# Patient Record
Sex: FEMALE | Race: WHITE | NOT HISPANIC OR LATINO | URBAN - METROPOLITAN AREA
[De-identification: names, ages, dates, MRNs, and addresses within clinical notes are randomized per-mention and may not be internally consistent; named-entity substitution may affect disease eponyms.]

---

## 2021-04-13 DIAGNOSIS — Z23 ENCOUNTER FOR IMMUNIZATION: ICD-10-CM

## 2021-04-30 ENCOUNTER — IMMUNIZATIONS (OUTPATIENT)
Dept: FAMILY MEDICINE CLINIC | Facility: HOSPITAL | Age: 61
End: 2021-04-30

## 2021-04-30 DIAGNOSIS — Z23 ENCOUNTER FOR IMMUNIZATION: Primary | ICD-10-CM

## 2021-04-30 PROCEDURE — 0001A SARS-COV-2 / COVID-19 MRNA VACCINE (PFIZER-BIONTECH) 30 MCG: CPT

## 2021-04-30 PROCEDURE — 91300 SARS-COV-2 / COVID-19 MRNA VACCINE (PFIZER-BIONTECH) 30 MCG: CPT

## 2021-05-21 ENCOUNTER — IMMUNIZATIONS (OUTPATIENT)
Dept: FAMILY MEDICINE CLINIC | Facility: HOSPITAL | Age: 61
End: 2021-05-21

## 2021-05-21 DIAGNOSIS — Z23 ENCOUNTER FOR IMMUNIZATION: Primary | ICD-10-CM

## 2021-05-21 PROCEDURE — 91300 SARS-COV-2 / COVID-19 MRNA VACCINE (PFIZER-BIONTECH) 30 MCG: CPT

## 2021-05-21 PROCEDURE — 0002A SARS-COV-2 / COVID-19 MRNA VACCINE (PFIZER-BIONTECH) 30 MCG: CPT

## 2023-08-29 ENCOUNTER — OFFICE VISIT (OUTPATIENT)
Dept: URGENT CARE | Facility: CLINIC | Age: 63
End: 2023-08-29
Payer: COMMERCIAL

## 2023-08-29 VITALS
HEIGHT: 60 IN | TEMPERATURE: 96.5 F | SYSTOLIC BLOOD PRESSURE: 152 MMHG | WEIGHT: 145 LBS | RESPIRATION RATE: 14 BRPM | DIASTOLIC BLOOD PRESSURE: 94 MMHG | HEART RATE: 106 BPM | OXYGEN SATURATION: 98 % | BODY MASS INDEX: 28.47 KG/M2

## 2023-08-29 DIAGNOSIS — S01.81XA CHIN LACERATION, INITIAL ENCOUNTER: Primary | ICD-10-CM

## 2023-08-29 PROCEDURE — 99213 OFFICE O/P EST LOW 20 MIN: CPT | Performed by: PHYSICIAN ASSISTANT

## 2023-08-29 PROCEDURE — 12051 INTMD RPR FACE/MM 2.5 CM/<: CPT | Performed by: PHYSICIAN ASSISTANT

## 2023-08-29 PROCEDURE — 90715 TDAP VACCINE 7 YRS/> IM: CPT | Performed by: PHYSICIAN ASSISTANT

## 2023-08-29 PROCEDURE — 12020 TX SUPFC WND DEHSN SMPL CLSR: CPT | Performed by: PHYSICIAN ASSISTANT

## 2023-08-29 PROCEDURE — 90471 IMMUNIZATION ADMIN: CPT | Performed by: PHYSICIAN ASSISTANT

## 2023-08-29 RX ORDER — DIPHENHYDRAMINE HYDROCHLORIDE AND LIDOCAINE HYDROCHLORIDE AND ALUMINUM HYDROXIDE AND MAGNESIUM HYDRO
10 KIT EVERY 4 HOURS PRN
Qty: 120 ML | Refills: 0 | Status: SHIPPED | OUTPATIENT
Start: 2023-08-29

## 2023-08-29 NOTE — PROGRESS NOTES
[facility]        NAME: Hazel Juarez is a 63 y.o. female  : 1960    MRN: [id number]  DATE: 2023  TIME: 10:27 AM    Assessment and Plan   Chin laceration, initial encounter [H70.06IB]  1. Chin laceration, initial encounter  Tdap Vaccine greater than or equal to 8yo    Laceration repair        Bleeding controlled and 4 sutures were placed in her chin. No obvious signs of fx. Patient Instructions   Patient Instructions     Come back on  or 9/3 for suture removal   We will not do antibiotics at this time as I have extensively cleaned the wound   If you notice any signs of infection such as redness to the area or drainage call the office and I will send in an oral antibiotic  Keep the area dry 24 hours. After that you may shower normally but do not soak the wound  If you develop a headache or blurry vision go to the ER         Care For Your Stitches   WHAT YOU NEED TO KNOW:   Stitches, or sutures, are used to close cuts and wounds on the skin. Stitches need to be removed after your wound has healed. DISCHARGE INSTRUCTIONS:   Return to the emergency department if:   • Your stitches come apart. • Blood soaks through your bandages. • You suddenly cannot move your injured joint. • You have sudden numbness around your wound. • You see red streaks coming from your wound. Contact your healthcare provider if:   • You have a fever and chills. • Your wound is red, warm, swollen, or leaking pus. • There is a bad smell coming from your wound. • You have increased pain in the wound area. • You have questions or concerns about your condition or care. Care for your stitches:   • Protect the stitches. You may need to cover your stitches with a bandage for 24 to 48 hours, or as directed. Do not bump or hit the suture area. This could open the wound. Do not trim or shorten the ends of your stitches.  If they rub on your clothing, put a gauze bandage between the stitches and your clothes. • Clean the area as directed. Carefully wash your wound with soap and water. For mouth and lip wounds, rinse your mouth after meals and at bedtime. Ask your healthcare provider what to use to rinse your mouth. If you have a scalp wound, you may gently wash your hair every 2 days with mild shampoo. Do not use hair products, such as hair spray. Check your wound for signs of infection when you clean it. Signs include redness, swelling, and pus. • Keep the area dry as directed. Wait 12 to 24 hours after you receive your stitches before you take a shower. Take showers instead of baths. Do not take a bath or swim. Your healthcare provider will give you instructions for bathing with your stitches. Help your wound heal:   • Elevate your wound. Keep your wound above the level of your heart as often as you can. This will help decrease swelling and pain. Prop the area on pillows or blankets, if possible, to keep it elevated comfortably. • Limit activity. Do not stretch the skin around your wound. This will help prevent bleeding and swelling. Follow up with your healthcare provider as directed: You may need to return to have your stitches removed. Write down your questions so you remember to ask them during your visits. © Copyright [facility] 2022 Information is for End User's use only and may not be sold, redistributed or otherwise used for commercial purposes. The above information is an  only. It is not intended as medical advice for individual conditions or treatments. Talk to your doctor, nurse or pharmacist before following any medical regimen to see if it is safe and effective for you. Follow up with PCP in 3-5 days. Proceed to  ER if symptoms worsen.     Chief Complaint     Chief Complaint   Patient presents with   • Facial Laceration     Pt reports walking and tripped falling on chin          History of Present Illness       The patient is a 61-year-old female presenting today for a 1 inch laceration to her chin. She reports that she was walking this morning when she tripped and fell onto her face. She hit her chin and her nose. Denies pain in the nose or forehead. Reports pain in her lip and chin. Also reports an abrasion on her left knee. Denies any headache, blurry vision, double vision or neurologic changes. Admits that the wound from her chin has been bleeding. - blood thinners or medications. Review of Systems   Review of Systems   Constitutional: Negative for activity change and appetite change. Skin: Positive for color change (abrasion to nose and L knee ) and wound (1 inch wound to the chin ). Neurological: Negative for dizziness, speech difficulty, weakness, light-headedness, numbness and headaches. Current Medications     No current outpatient medications on file. Current Allergies     Allergies as of 08/29/2023 - Reviewed 08/29/2023   Allergen Reaction Noted   • Penicillins Hives 08/29/2014            The following portions of the patient's history were reviewed and updated as appropriate: allergies, current medications, past family history, past medical history, past social history, past surgical history and problem list.     History reviewed. No pertinent past medical history. History reviewed. No pertinent surgical history. History reviewed. No pertinent family history. Medications have been verified. Objective   /94   Pulse (!) 106   Temp (!) 96.5 °F (35.8 °C)   Resp 14   Ht 5' (1.524 m)   Wt 65.8 kg (145 lb)   SpO2 98%   BMI 28.32 kg/m²          Physical Exam     Physical Exam  Vitals and nursing note reviewed. Constitutional:       Appearance: Normal appearance. She is normal weight. HENT:      Mouth/Throat:     Cardiovascular:      Rate and Rhythm: Normal rate. Pulmonary:      Effort: Pulmonary effort is normal.   Musculoskeletal:         General: Normal range of motion. Skin:     General: Skin is warm. Capillary Refill: Capillary refill takes less than 2 seconds. Neurological:      Mental Status: She is alert. Universal Protocol:  Consent: Verbal consent obtained. Risks and benefits: risks, benefits and alternatives were discussed  Consent given by: patient  Patient understanding: patient states understanding of the procedure being performed  Patient consent: the patient's understanding of the procedure matches consent given  Procedure consent: procedure consent matches procedure scheduled  Relevant documents: relevant documents present and verified (Denied allergies )  Test results: test results available and properly labeled  Site marked: the operative site was marked  Required items: required blood products, implants, devices, and special equipment available  Patient identity confirmed: verbally with patient    Laceration repair    Date/Time: 8/29/2023 9:00 AM    Performed by: Enrique Molina PA-C  Authorized by: Enrique Molina PA-C  Body area: head/neck  Location details: chin  Laceration length: 2.5 cm  Foreign body present: gravel removed   Tendon involvement: none  Nerve involvement: none    Anesthesia:  Local Anesthetic: lidocaine 1% with epinephrine  Anesthetic total: 5 mL    Sedation:  Patient sedated: no      Wound Dehiscence:  Superficial Wound Dehiscence: simple closure      Procedure Details:  Preparation: Patient was prepped and draped in the usual sterile fashion. Irrigation solution: saline (30 ml)  Irrigation method: syringe  Amount of cleaning: extensive (cleaned with wound cleanser and betadine )  Skin closure: 6-0 nylon  Number of sutures: 4  Technique: simple  Approximation: close  Approximation difficulty: simple  Dressing: non-adhesive packing strip  Patient tolerance: patient tolerated the procedure well with no immediate complications  Comments: Non adhesive strip and transparent film dressing placed over it. Cleaning details: gravel

## 2023-08-29 NOTE — PATIENT INSTRUCTIONS
Come back on 9/2 or 9/3 for suture removal   We will not do antibiotics at this time as I have extensively cleaned the wound   If you notice any signs of infection such as redness to the area or drainage call the office and I will send in an oral antibiotic  Keep the area dry 24 hours. After that you may shower normally but do not soak the wound  If you develop a headache or blurry vision go to the ER         Care For Your Stitches   WHAT YOU NEED TO KNOW:   Stitches, or sutures, are used to close cuts and wounds on the skin. Stitches need to be removed after your wound has healed. DISCHARGE INSTRUCTIONS:   Return to the emergency department if:   Your stitches come apart. Blood soaks through your bandages. You suddenly cannot move your injured joint. You have sudden numbness around your wound. You see red streaks coming from your wound. Contact your healthcare provider if:   You have a fever and chills. Your wound is red, warm, swollen, or leaking pus. There is a bad smell coming from your wound. You have increased pain in the wound area. You have questions or concerns about your condition or care. Care for your stitches:   Protect the stitches. You may need to cover your stitches with a bandage for 24 to 48 hours, or as directed. Do not bump or hit the suture area. This could open the wound. Do not trim or shorten the ends of your stitches. If they rub on your clothing, put a gauze bandage between the stitches and your clothes. Clean the area as directed. Carefully wash your wound with soap and water. For mouth and lip wounds, rinse your mouth after meals and at bedtime. Ask your healthcare provider what to use to rinse your mouth. If you have a scalp wound, you may gently wash your hair every 2 days with mild shampoo. Do not use hair products, such as hair spray. Check your wound for signs of infection when you clean it. Signs include redness, swelling, and pus.     Keep the area dry as directed. Wait 12 to 24 hours after you receive your stitches before you take a shower. Take showers instead of baths. Do not take a bath or swim. Your healthcare provider will give you instructions for bathing with your stitches. Help your wound heal:   Elevate your wound. Keep your wound above the level of your heart as often as you can. This will help decrease swelling and pain. Prop the area on pillows or blankets, if possible, to keep it elevated comfortably. Limit activity. Do not stretch the skin around your wound. This will help prevent bleeding and swelling. Follow up with your healthcare provider as directed: You may need to return to have your stitches removed. Write down your questions so you remember to ask them during your visits. © Copyright [facility] 2022 Information is for End User's use only and may not be sold, redistributed or otherwise used for commercial purposes. The above information is an  only. It is not intended as medical advice for individual conditions or treatments. Talk to your doctor, nurse or pharmacist before following any medical regimen to see if it is safe and effective for you.

## 2023-09-01 ENCOUNTER — OFFICE VISIT (OUTPATIENT)
Dept: URGENT CARE | Facility: CLINIC | Age: 63
End: 2023-09-01
Payer: COMMERCIAL

## 2023-09-01 VITALS
OXYGEN SATURATION: 100 % | SYSTOLIC BLOOD PRESSURE: 110 MMHG | BODY MASS INDEX: 28.32 KG/M2 | HEIGHT: 60 IN | TEMPERATURE: 97 F | RESPIRATION RATE: 16 BRPM | DIASTOLIC BLOOD PRESSURE: 70 MMHG | HEART RATE: 76 BPM

## 2023-09-01 DIAGNOSIS — S01.81XD CHIN LACERATION, SUBSEQUENT ENCOUNTER: Primary | ICD-10-CM

## 2023-09-01 DIAGNOSIS — Z48.02 ENCOUNTER FOR REMOVAL OF SUTURES: ICD-10-CM

## 2023-09-01 PROCEDURE — 99213 OFFICE O/P EST LOW 20 MIN: CPT | Performed by: PHYSICIAN ASSISTANT

## 2023-09-01 NOTE — PATIENT INSTRUCTIONS
Sutures removed, wound appears well-healed, no signs of infection. Discussed proper wound care. Follow-up with PCP.

## 2023-09-01 NOTE — PROGRESS NOTES
[facility]        NAME: Ismael Perry is a 63 y.o. female  : 1960    MRN: [id number]  DATE: 2023  TIME: 11:50 AM    Assessment and Plan   Chin laceration, subsequent encounter [S01.81XD]  1. Chin laceration, subsequent encounter  Suture removal      2. Encounter for removal of sutures  Suture removal            Patient Instructions     Patient Instructions   Sutures removed, wound appears well-healed, no signs of infection. Discussed proper wound care. Follow-up with PCP. Follow up with PCP in 3-5 days. Proceed to  ER if symptoms worsen. Chief Complaint     Chief Complaint   Patient presents with   • Suture / Staple Removal     For suture removal today         History of Present Illness       Patient is a 24-year-old female presenting today for suture removal.  Patient was seen and evaluated here on 2023 after sustaining a injury to her chin, notes while out on a walk she lost her balance and hit her chin against the sidewalk, had 4 sutures placed to her chin, has been keeping the area clean and dry, denies any signs of infection. Denies fever, wound discharge or drainage, bleeding, numbness, tingling. Review of Systems   Review of Systems   Constitutional: Negative for chills and fever. HENT: Negative for ear pain and sore throat. Eyes: Negative for pain and visual disturbance. Respiratory: Negative for cough and shortness of breath. Cardiovascular: Negative for chest pain and palpitations. Gastrointestinal: Negative for abdominal pain and vomiting. Genitourinary: Negative for dysuria and hematuria. Musculoskeletal: Negative for arthralgias and back pain. Skin: Positive for wound. Neurological: Negative for seizures and syncope. All other systems reviewed and are negative.         Current Medications       Current Outpatient Medications:   •  diphenhydramine, lidocaine, Al/Mg hydroxide, simethicone (Magic Mouthwash) SUSP, Swish and spit 10 mL every 4 (four) hours as needed for mouth pain or discomfort, Disp: 120 mL, Rfl: 0    Current Allergies     Allergies as of 09/01/2023 - Reviewed 09/01/2023   Allergen Reaction Noted   • Penicillins Hives 08/29/2014            The following portions of the patient's history were reviewed and updated as appropriate: allergies, current medications, past family history, past medical history, past social history, past surgical history and problem list.     History reviewed. No pertinent past medical history. History reviewed. No pertinent surgical history. History reviewed. No pertinent family history. Medications have been verified. Objective   /70 (BP Location: Left arm)   Pulse 76   Temp (!) 97 °F (36.1 °C) (Tympanic)   Resp 16   Ht 5' (1.524 m)   SpO2 100%   BMI 28.32 kg/m²        Physical Exam     Physical Exam  Vitals and nursing note reviewed. Constitutional:       General: She is not in acute distress. Appearance: Normal appearance. HENT:      Head: Normocephalic. Comments: Well-healed laceration on bottom of chin, 4 sutures in place, no active bleeding, no wound dehiscence  Cardiovascular:      Rate and Rhythm: Normal rate. Pulses: Normal pulses. Pulmonary:      Effort: Pulmonary effort is normal.   Skin:     General: Skin is warm. Neurological:      Mental Status: She is alert. Suture removal    Date/Time: 9/1/2023 11:15 AM    Performed by: Nahid Miller PA-C  Authorized by: Nahid Miller PA-C  Universal Protocol:  Consent: Verbal consent obtained. Risks and benefits: risks, benefits and alternatives were discussed  Consent given by: patient  Time out: Immediately prior to procedure a "time out" was called to verify the correct patient, procedure, equipment, support staff and site/side marked as required.   Patient understanding: patient states understanding of the procedure being performed  Patient identity confirmed: verbally with patient        Patient location:  Bedside  Location:     Location:  [address] [place] [address] location:  Chin  Procedure details: Tools used:  Suture removal kit    Wound appearance:  No sign(s) of infection and good wound healing    Number of sutures removed:  4  Post-procedure details:     Post-removal:  Antibiotic ointment applied and dressing applied    Patient tolerance of procedure:   Tolerated well, no immediate complications